# Patient Record
Sex: MALE | URBAN - METROPOLITAN AREA
[De-identification: names, ages, dates, MRNs, and addresses within clinical notes are randomized per-mention and may not be internally consistent; named-entity substitution may affect disease eponyms.]

---

## 2020-09-02 ENCOUNTER — APPOINTMENT (RX ONLY)
Dept: URBAN - METROPOLITAN AREA CLINIC 133 | Facility: CLINIC | Age: 35
Setting detail: DERMATOLOGY
End: 2020-09-02

## 2020-09-02 VITALS — TEMPERATURE: 97.7 F

## 2020-09-02 DIAGNOSIS — Z71.89 OTHER SPECIFIED COUNSELING: ICD-10-CM

## 2020-09-02 DIAGNOSIS — D18.0 HEMANGIOMA: ICD-10-CM

## 2020-09-02 DIAGNOSIS — D22 MELANOCYTIC NEVI: ICD-10-CM

## 2020-09-02 DIAGNOSIS — L40.8 OTHER PSORIASIS: ICD-10-CM

## 2020-09-02 DIAGNOSIS — L81.4 OTHER MELANIN HYPERPIGMENTATION: ICD-10-CM

## 2020-09-02 PROBLEM — D22.5 MELANOCYTIC NEVI OF TRUNK: Status: ACTIVE | Noted: 2020-09-02

## 2020-09-02 PROBLEM — D18.01 HEMANGIOMA OF SKIN AND SUBCUTANEOUS TISSUE: Status: ACTIVE | Noted: 2020-09-02

## 2020-09-02 PROBLEM — L30.9 DERMATITIS, UNSPECIFIED: Status: ACTIVE | Noted: 2020-09-02

## 2020-09-02 PROCEDURE — ? COUNSELING

## 2020-09-02 PROCEDURE — ? OTHER

## 2020-09-02 PROCEDURE — ? OBSERVATION

## 2020-09-02 PROCEDURE — ? PRESCRIPTION

## 2020-09-02 PROCEDURE — 99203 OFFICE O/P NEW LOW 30 MIN: CPT

## 2020-09-02 RX ORDER — TACROLIMUS 1 MG/G
OINTMENT TOPICAL BID
Qty: 1 | Refills: 1 | Status: ERX | COMMUNITY
Start: 2020-09-02

## 2020-09-02 RX ADMIN — TACROLIMUS: 1 OINTMENT TOPICAL at 00:00

## 2020-09-02 ASSESSMENT — LOCATION ZONE DERM
LOCATION ZONE: TRUNK
LOCATION ZONE: FACE
LOCATION ZONE: SCALP

## 2020-09-02 ASSESSMENT — LOCATION DETAILED DESCRIPTION DERM
LOCATION DETAILED: RIGHT SUPERIOR PARIETAL SCALP
LOCATION DETAILED: LEFT SUPERIOR FOREHEAD
LOCATION DETAILED: LEFT MEDIAL UPPER BACK
LOCATION DETAILED: LEFT SUPERIOR UPPER BACK

## 2020-09-02 ASSESSMENT — LOCATION SIMPLE DESCRIPTION DERM
LOCATION SIMPLE: LEFT FOREHEAD
LOCATION SIMPLE: SCALP
LOCATION SIMPLE: LEFT UPPER BACK

## 2020-09-02 NOTE — HPI: RASH
What Type Of Note Output Would You Prefer (Optional)?: Standard Output
Is The Patient Presenting As Previously Scheduled?: Yes
How Severe Is Your Rash?: moderate
Is This A New Presentation, Or A Follow-Up?: Rash
Additional History: Rash is recurrent but does come and go, the patient is unsure what his triggers are. Patient uses otc hydrocortisone, which helps a little. His former PCP thought it may be a yeast infection. He recommended he used lotrimin, tried several times, but it does not help.  Patient denies hx of eczema or psoriasis. Denies family hx of psoriasis. Denies using any products in the groin including baby wipes.

## 2020-09-02 NOTE — PROCEDURE: OBSERVATION
Detail Level: Detailed
Size Of Lesion In Cm (Optional): 0
Detail Level: Zone
Detail Level: Generalized
Body Location Override (Optional - Billing Will Still Be Based On Selected Body Map Location If Applicable): forehead
Body Location Override (Optional - Billing Will Still Be Based On Selected Body Map Location If Applicable): scalp

## 2020-09-02 NOTE — PROCEDURE: MIPS QUALITY
Quality 265: Biopsy Follow-Up: Biopsy results reviewed, communicated, tracked, and documented
Quality 226: Preventive Care And Screening: Tobacco Use: Screening And Cessation Intervention: Patient screened for tobacco use and is an ex/non-smoker
Detail Level: Detailed
Quality 431: Preventive Care And Screening: Unhealthy Alcohol Use - Screening: Patient screened for unhealthy alcohol use using a single question and scores less than 2 times per year
Quality 130: Documentation Of Current Medications In The Medical Record: Current Medications Documented

## 2020-09-02 NOTE — PROCEDURE: OTHER
Other (Free Text): Not much to see today. Instructed the patient to call in and be seen same day if/when this rash flares.
Note Text (......Xxx Chief Complaint.): This diagnosis correlates with the
Detail Level: Zone